# Patient Record
Sex: FEMALE | Race: OTHER | Employment: OTHER | URBAN - METROPOLITAN AREA
[De-identification: names, ages, dates, MRNs, and addresses within clinical notes are randomized per-mention and may not be internally consistent; named-entity substitution may affect disease eponyms.]

---

## 2018-07-19 ENCOUNTER — TELEPHONE (OUTPATIENT)
Dept: NEUROLOGY | Age: 58
End: 2018-07-19

## 2018-07-19 ENCOUNTER — TELEPHONE (OUTPATIENT)
Dept: SURGERY | Age: 58
End: 2018-07-19

## 2018-07-19 NOTE — TELEPHONE ENCOUNTER
KARENA to schedule with ANA PAULA Wray for colon screening consult.  Per rapid fax from Ashish Rachel MD

## 2018-08-27 ENCOUNTER — OFFICE VISIT (OUTPATIENT)
Dept: NEUROLOGY | Age: 58
End: 2018-08-27

## 2018-08-27 ENCOUNTER — HOSPITAL ENCOUNTER (OUTPATIENT)
Dept: LAB | Age: 58
Discharge: HOME | End: 2018-08-27
Payer: MEDICAID

## 2018-08-27 VITALS
HEIGHT: 67 IN | SYSTOLIC BLOOD PRESSURE: 90 MMHG | DIASTOLIC BLOOD PRESSURE: 66 MMHG | WEIGHT: 194.2 LBS | BODY MASS INDEX: 30.48 KG/M2 | HEART RATE: 61 BPM | OXYGEN SATURATION: 99 % | TEMPERATURE: 97.4 F | RESPIRATION RATE: 18 BRPM

## 2018-08-27 DIAGNOSIS — R41.89 COGNITIVE DECLINE: ICD-10-CM

## 2018-08-27 DIAGNOSIS — R41.0 CONFUSION: ICD-10-CM

## 2018-08-27 DIAGNOSIS — R41.3 MEMORY LOSS: ICD-10-CM

## 2018-08-27 DIAGNOSIS — R41.3 MEMORY LOSS: Primary | ICD-10-CM

## 2018-08-27 LAB
T4 FREE SERPL-MCNC: 1 NG/DL (ref 0.7–1.5)
TSH SERPL DL<=0.05 MIU/L-ACNC: 2.16 UIU/ML (ref 0.36–3.74)
VIT B12 SERPL-MCNC: 486 PG/ML (ref 211–911)

## 2018-08-27 PROCEDURE — 84443 ASSAY THYROID STIM HORMONE: CPT | Performed by: PSYCHIATRY & NEUROLOGY

## 2018-08-27 PROCEDURE — 84439 ASSAY OF FREE THYROXINE: CPT | Performed by: PSYCHIATRY & NEUROLOGY

## 2018-08-27 PROCEDURE — 36415 COLL VENOUS BLD VENIPUNCTURE: CPT | Performed by: PSYCHIATRY & NEUROLOGY

## 2018-08-27 PROCEDURE — 82607 VITAMIN B-12: CPT | Performed by: PSYCHIATRY & NEUROLOGY

## 2018-08-27 RX ORDER — HYDROXYZINE 25 MG/1
TABLET, FILM COATED ORAL
COMMUNITY

## 2018-08-27 RX ORDER — CYCLOBENZAPRINE HCL 5 MG
5 TABLET ORAL
COMMUNITY

## 2018-08-27 RX ORDER — HYDROCHLOROTHIAZIDE 25 MG/1
25 TABLET ORAL DAILY
COMMUNITY

## 2018-08-27 RX ORDER — ATORVASTATIN CALCIUM 10 MG/1
TABLET, FILM COATED ORAL DAILY
COMMUNITY

## 2018-08-27 RX ORDER — AMLODIPINE BESYLATE 2.5 MG/1
5 TABLET ORAL DAILY
COMMUNITY

## 2018-08-27 RX ORDER — VALACYCLOVIR HYDROCHLORIDE 1 G/1
TABLET, FILM COATED ORAL
COMMUNITY

## 2018-08-27 NOTE — PROGRESS NOTES
[address], [address], [place], [address]  [address] [place], [address]  Office:  [phone]  Fax: [phone]    Referring: Chava Deng MD      Chief Complaint   Patient presents with    Neurologic Problem     Brain Cyst since age 1. Cognitive getting worse. 50-year-old woman who presents today for evaluation of what she calls is difficulty with her memory and forgetfulness. She tells me that she has had progressive cognitive decline. She says that she is unable to care for herself or hold a job secondary to this. She says when she is driving she will get lost and not to where she is. She will have to pull over and figure it out. She says that she does not go places that she is not familiar anymore. She says in the evening she tends to get more confused and believes that she may be \"sundowning\". 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 She has a sister who is 13years older who passed secondary to dementia thought to be Alzheimer's and she  in the nursing home. Her mother also had dementia and  in a nursing home. She is worried that this is what happened to her. She forgets her medicines at times. She forgets to pay bills. She sometimes is paranoid and thinks that people are running up her bills and she says she does not really understand that because her credit is terrible. She says she is aware that these are not logical concerns. She misplaces things and at times she thinks people have moved them. She does not see things that are not there. She is not hearing voices. She does say that she has bipolar disorder and she stopped taking her medications about a year ago secondary to being too sedated. She says she thinks is a good decision. She does not find any correlation to stopping her bipolar medicines and her complaints of cognitive difficulty.   She has not seen psychiatry in over a year. She denies any current suicidal or homicidal ideation although she says that there are times when she does have such. She denies that she would ever act on such thoughts. She has a history of thyroid dysfunction and says that her daughter also has thyroid dysfunction but she has not had her thyroid checked recently. She cannot recall if it was hyperthyroid to hypothyroid but notes she was on medication some years ago. She has not had a recent B12. She also tells me that she was told that she has an inoperable brain cyst and she has not had any recent imaging. No loss or alteration in consciousness. No occult type seizure symptom. She does not awaken in the floor and on how she got there. No chest pain or palpitations. No recent fever chills. Past Medical History:   Diagnosis Date    Bipolar 1 disorder ([facility] [address]     Depression     Heart disease 2010    Hypertension     Neurological disorder     Thyroid disease        Past Surgical History:   Procedure Laterality Date    HX CHOLECYSTECTOMY         Current Outpatient Prescriptions   Medication Sig Dispense Refill    HYDROcodone-acetaminophen (NORCO) 5-325 mg per tablet Take 1 tablet by mouth every four (4) hours as needed for Pain. 10 tablet 0    lisinopril-hydrochlorothiazide (PRINZIDE, ZESTORETIC) 20-25 mg per tablet Take 1 Tab by mouth daily. 30 Tab 2    pravastatin (PRAVACHOL) 40 mg tablet Take 1 Tab by mouth nightly. 30 Tab 2    aspirin 81 mg tablet Take 1 Tab by mouth.       atenolol (TENORMIN) 25 mg tablet 1/2 tab po daily 30 Tab 2        Allergies   Allergen Reactions    Penicillins Rash       Social History   Substance Use Topics    Smoking status: Never Smoker    Smokeless tobacco: Never Used    Alcohol use 1.5 oz/week     3 Standard drinks or equivalent per week       Family History   Problem Relation Age of Onset    Cancer Mother     Cancer Father     Cancer Brother        Review of Systems:  Pertinent positives and negatives as noted otherwise comprehensive review is negative. Physical Examination:    Visit Vitals    BP 90/66 (BP 1 Location: Left arm, BP Patient Position: Sitting)    Pulse 61    Temp 97.4 °F (36.3 °C) (Oral)    Resp 18    Ht 5' 7\" (1.702 m)    Wt 88.1 kg (194 lb 3.2 oz)    SpO2 99%    BMI 30.42 kg/m2       She is pleasant. She is appropriate dress and appropriate grooming. She has an odd affect. No icterus. No edema. Heart regular. Neck is supple. No bruit. Symmetric pulses. No edema the lower extremities. Neurologically she is awake alert oriented and conversant. Her speech and language are normal.  She discusses her medical history. She discusses current events. She answers orientation questions appropriately. She is fluent. She has intact cranial nerves II-XII. No nystagmus. No abnormal movement. No abnormality of bulk or tone. No pronation. No drift. She resists fully in the upper and lower extremities in all muscle groups to direct testing. Reflexes are symmetrical in the upper and lower extremities bilaterally. No pathologic reflexes elicited. No ataxia. Sensory intact primary. Gait is steady. Impression/Plan  27-year-old lady with significant psychiatric disease off her medications but with significant family history of dementia with both her mother and her sister dying of dementia in the nursing home and with cognitive complaints, confusion as noted above and certainly differential diagnosis includes a dementing process versus metabolic versus anatomic/structural versus vascular versus secondary to underlying psychopathology versus other. At this juncture we will proceed with an MRI of the brain to evaluate for anatomic abnormality in the frontal temporal lobes that could be responsible in this in light particularly of her history of what she calls a cyst in her brain we need to know exactly what is ongoing there anatomically. We will get an EEG to evaluate baseline rhythms. Carotid Doppler to ensure adequate cerebral blood flow. In addition we will check B12 and thyroid function particularly given her history of thyroid disorder in the past and she is not on any thyroid medication. We will get a formal neuropsychological evaluation with neuropsychology to determine her level of functioning and what her true memory function is. She will return at the conclusion of her studies. Signed By: Juliana Sanz MD          This note was created using voice recognition software. Despite editing, there may be syntax errors. This note will not be viewable in [address]

## 2018-08-27 NOTE — PROGRESS NOTES
Chief Complaint   Patient presents with    Neurologic Problem     Brain Cyst since age 1. Cognitive getting worse.

## 2018-08-27 NOTE — MR AVS SNAPSHOT
[address] [facility] [address] [facility] 
[phone] Patient: April Gregg MRN: [id number] ISD:9/52/2001 Visit Information Date & Time Provider Department Dept. Phone Encounter #  
 8/27/2018 10:00 AM Reynold Mahoney MD [facility] at [address] [place] [address] [id number] Follow-up Instructions Return for NP visit after tests. Upcoming Health Maintenance Date Due Hepatitis C Screening 1960 DTaP/Tdap/Td series (1 - Tdap) 4/29/1980 PAP AKA CERVICAL CYTOLOGY 4/29/1980 BREAST CANCER SCRN MAMMOGRAM 4/29/2009 FOBT Q 1 YEAR AGE 50-75 4/29/2009 Influenza Age 5 to Adult 8/1/2018 Allergies as of 8/27/2018  Review Complete On: 8/27/2018 By: Reynold Mahoney MD  
  
 Severity Noted Reaction Type Reactions Penicillins  04/11/2013    Rash Current Immunizations  Never Reviewed No immunizations on file. Not reviewed this visit You Were Diagnosed With   
  
 Codes Comments Memory loss    -  Primary ICD-10-CM: R41.3 ICD-9-CM: 780.93 Confusion     ICD-10-CM: R41.0 ICD-9-CM: 298.9 Cognitive decline     ICD-10-CM: R41.89 ICD-9-CM: 294.9 Vitals BP Pulse Temp Resp Height(growth percentile) Weight(growth percentile) 90/66 (BP 1 Location: Left arm, BP Patient Position: Sitting) 61 97.4 °F (36.3 °C) (Oral) 18 5' 7\" (1.702 m) 194 lb 3.2 oz (88.1 kg) SpO2 BMI OB Status Smoking Status 99% 30.42 kg/m2 Postmenopausal Never Smoker BMI and BSA Data Body Mass Index Body Surface Area  
 30.42 kg/m 2 2.04 m 2 Preferred Pharmacy Pharmacy Name Phone [facility] [phone] Your Updated Medication List  
  
   
This list is accurate as of 8/27/18 10:20 AM.  Always use your most recent med list. amLODIPine 2.5 mg tablet Commonly known as:  Gabino Daugherty Take  by mouth daily. aspirin 81 mg tablet Take 1 Tab by mouth. atenolol 25 mg tablet Commonly known as:  TENORMIN  
1/2 tab po daily  
  
 atorvastatin 10 mg tablet Commonly known as:  LIPITOR Take  by mouth daily. cyclobenzaprine 5 mg tablet Commonly known as:  FLEXERIL Take 5 mg by mouth. GLUCOSAMINE 1500 COMPLEX PO Take  by mouth. hydroCHLOROthiazide 25 mg tablet Commonly known as:  HYDRODIURIL Take 25 mg by mouth daily. HYDROcodone-acetaminophen 5-325 mg per tablet Commonly known as:  March Castles Take 1 tablet by mouth every four (4) hours as needed for Pain.  
  
 hydrOXYzine HCl 25 mg tablet Commonly known as:  ATARAX Take  by mouth three (3) times daily as needed for Itching. lisinopril-hydroCHLOROthiazide 20-25 mg per tablet Commonly known as:  [place]ana Kemps Take 1 Tab by mouth daily. pravastatin 40 mg tablet Commonly known as:  PRAVACHOL Take 1 Tab by mouth nightly. valACYclovir 1 gram tablet Commonly known as:  VALTREX Take  by mouth. We Performed the Following REFERRAL TO NEUROPSYCHOLOGY [RDR58 Custom] Comments:  
 Memory difficulty and fhx of dementia Follow-up Instructions Return for NP visit after tests. To-Do List   
 08/27/2018 Vascular/US:  DUPLEX CAROTID BILATERAL   
  
 08/27/2018 Neurology:  EEG   
  
 08/27/2018 Imaging:  MRI BRAIN W WO CONT   
  
 08/27/2018 Lab:  T4, FREE   
  
 08/27/2018 Lab:  TSH 3RD GENERATION   
  
 08/27/2018 Lab:  VITAMIN B12 Referral Information Referral ID Referred By Referred To  
  
 [id number] ADAM SLADE Not Available Visits Status Start Date End Date 1 New Request 8/27/18 8/27/19 If your referral has a status of pending review or denied, additional information will be sent to support the outcome of this decision. Patient Instructions Thank you for choosing [facility] for your  
 
care. You may receive a survey about your visit. We appreciate you taking time  
 
to complete this survey as we use your feedback to improve our services. We  
 
realize we are not perfect, but we strive to provide excellent care. Introducing [facility]! Dear Antonio Tobin: Thank you for requesting a [employer] account. Our records indicate that you already have an active [employer] account. You can access your account anytime at [email or web address]. [email or web address] Did you know that you can access your hospital and ER discharge instructions at any time in [employer]? You can also review all of your test results from your hospital stay or ER visit. Additional Information If you have questions, please visit the Frequently Asked Questions section of the [employer] website at [email or web address] Remember, [employer] is NOT to be used for urgent needs. For medical emergencies, dial 911. Now available from your iPhone and Android! Please provide this summary of care documentation to your next provider. Your primary care clinician is listed as Ivania Terrazas. If you have any questions after today's visit, please call [phone].

## 2018-08-27 NOTE — PATIENT INSTRUCTIONS
Thank you for choosing [facility] and [facility] for your     care. You may receive a survey about your visit. We appreciate you taking time     to complete this survey as we use your feedback to improve our services. We     realize we are not perfect, but we strive to provide excellent care.

## 2018-08-31 ENCOUNTER — TELEPHONE (OUTPATIENT)
Dept: NEUROLOGY | Age: 58
End: 2018-08-31

## 2018-08-31 NOTE — TELEPHONE ENCOUNTER
Notification of inability to kip apt   Dr. Carlos Rob still in credentialing.   Placed in NP Nick's folder @ [address] [place] [address]

## 2018-09-05 NOTE — TELEPHONE ENCOUNTER
Referral transferred to Dr. Sharmin Lima, with [facility].  Spoke with patient verified name and , patient informed of new referral.

## 2018-09-21 ENCOUNTER — HOSPITAL ENCOUNTER (OUTPATIENT)
Dept: NEUROLOGY | Age: 58
Discharge: HOME | End: 2018-09-21
Payer: MEDICAID

## 2018-09-21 ENCOUNTER — HOSPITAL ENCOUNTER (OUTPATIENT)
Dept: VASCULAR SURGERY | Age: 58
Discharge: HOME | End: 2018-09-21
Payer: MEDICAID

## 2018-09-21 ENCOUNTER — HOSPITAL ENCOUNTER (OUTPATIENT)
Dept: MRI IMAGING | Age: 58
Discharge: HOME | End: 2018-09-21
Payer: MEDICAID

## 2018-09-21 VITALS — BODY MASS INDEX: 27.41 KG/M2 | WEIGHT: 175 LBS

## 2018-09-21 DIAGNOSIS — R41.89 COGNITIVE DECLINE: ICD-10-CM

## 2018-09-21 DIAGNOSIS — R41.0 CONFUSION: ICD-10-CM

## 2018-09-21 DIAGNOSIS — R41.3 MEMORY LOSS: ICD-10-CM

## 2018-09-21 LAB — CREAT UR-MCNC: 1 MG/DL (ref 0.6–1.3)

## 2018-09-21 PROCEDURE — 82565 ASSAY OF CREATININE: CPT

## 2018-09-21 PROCEDURE — 93880 EXTRACRANIAL BILAT STUDY: CPT

## 2018-09-21 PROCEDURE — 74011250636 HC RX REV CODE- 250/636

## 2018-09-21 PROCEDURE — 70553 MRI BRAIN STEM W/O & W/DYE: CPT

## 2018-09-21 PROCEDURE — A9575 INJ GADOTERATE MEGLUMI 0.1ML: HCPCS

## 2018-09-21 PROCEDURE — 95819 EEG AWAKE AND ASLEEP: CPT

## 2018-09-21 PROCEDURE — 95816 EEG AWAKE AND DROWSY: CPT

## 2018-09-21 RX ORDER — GADOTERATE MEGLUMINE 376.9 MG/ML
15 INJECTION INTRAVENOUS
Status: COMPLETED | OUTPATIENT
Start: 2018-09-21 | End: 2018-09-21

## 2018-09-21 RX ADMIN — GADOTERATE MEGLUMINE 15 ML: 376.9 INJECTION INTRAVENOUS at 12:16

## 2018-09-24 LAB
LEFT CCA DIST DIAS: 21.26 CM/S
LEFT CCA DIST SYS: 63.79 CM/S
LEFT CCA MID DIAS: 22.15 CM/S
LEFT CCA MID SYS: 78.85 CM/S
LEFT CCA PROX DIAS: 18.78 CM/S
LEFT CCA PROX SYS: 74.18 CM/S
LEFT ECA DIAS: 16.07 CM/S
LEFT ECA SYS: 73.77 CM/S
LEFT ICA DIST DIAS: 26.58 CM/S
LEFT ICA DIST SYS: 70.07 CM/S
LEFT ICA MID DIAS: 28.45 CM/S
LEFT ICA MID SYS: 64.84 CM/S
LEFT ICA PROX DIAS: 16.53 CM/S
LEFT ICA PROX SYS: 63.11 CM/S
LEFT ICA/CCA SYS: 0.89
LEFT SUBCLAVIAN DIAS: 0 CM/S
LEFT SUBCLAVIAN SYS: 85.09 CM/S
LEFT VERTEBRAL DIAS: 10.59 CM/S
LEFT VERTEBRAL SYS: 32.5 CM/S
RIGHT CCA DIST DIAS: 19.54 CM/S
RIGHT CCA DIST SYS: 63.11 CM/S
RIGHT CCA MID DIAS: 20.7 CM/S
RIGHT CCA MID SYS: 67.69 CM/S
RIGHT CCA PROX DIAS: 20.7 CM/S
RIGHT CCA PROX SYS: 94.76 CM/S
RIGHT ECA DIAS: 12.02 CM/S
RIGHT ECA SYS: 77.19 CM/S
RIGHT ICA DIST DIAS: 23.92 CM/S
RIGHT ICA DIST SYS: 54.04 CM/S
RIGHT ICA MID DIAS: 27.51 CM/S
RIGHT ICA MID SYS: 69.93 CM/S
RIGHT ICA PROX DIAS: 22.14 CM/S
RIGHT ICA PROX SYS: 62.63 CM/S
RIGHT ICA/CCA SYS: 0.74
RIGHT SUBCLAVIAN DIAS: 0 CM/S
RIGHT SUBCLAVIAN SYS: 98.59 CM/S
RIGHT VERTEBRAL DIAS: 19.31 CM/S
RIGHT VERTEBRAL SYS: 58.55 CM/S

## 2018-09-26 NOTE — PROCEDURES
[address]  MR#: [id number]  : 1960  ACCOUNT #: [de-identified]   DATE OF SERVICE: 2018    EEG NUMBER:  [facility]. This is an apparently wakeful and drowsy EEG on this 60year-old patient with who appears to have some memory problems for the past several months. She apparently has had some transient neurologic symptoms and a memory disorder. MEDICATIONS:  Norvasc, albuterol, Atarax, Flexeril, aspirin, Lipitor. EEG REPORT:  The predominant apparently wakeful background consists of 30-40 microvolt, irregular but symmetrical 7-8 Hz waves, which attenuate poorly with eye opening. Bifrontal 3-5 microvolt, 16-20 Hz waves are seen, which are also symmetrical in their occurrence. Step flash photic stimulation was performed that was driving between 3 and 12 flashes per second. During what appears to be drowsiness, the posterior rhythm consists of 20-30 microvolts, 6-7, and 15-20 microvolts, 4-5 Hz waves. Symmetric vertex sharp waves are identified. Noted intermittently throughout the record, emanating from about the T3 and T4 region, the occurrence of 20-30 microvolt, 4-5 Hz waves. IMPRESSION:  This was an abnormal wakeful and drowsy EEG. Due to the presence of some generalized slow wave activity indicative of a diffuse encephalopathic process. No epileptiform activity was identified.       MD EARL Ackerman / HANNAH  D: 2018 17:46     T: 2018 00:42  JOB #: [id number]  CC: Mitali Mueller MD

## 2019-05-09 ENCOUNTER — OFFICE VISIT (OUTPATIENT)
Dept: ORTHOPEDIC SURGERY | Age: 59
End: 2019-05-09

## 2019-05-09 VITALS
WEIGHT: 194.4 LBS | DIASTOLIC BLOOD PRESSURE: 81 MMHG | HEART RATE: 70 BPM | BODY MASS INDEX: 30.51 KG/M2 | HEIGHT: 67 IN | OXYGEN SATURATION: 94 % | TEMPERATURE: 98.3 F | SYSTOLIC BLOOD PRESSURE: 133 MMHG

## 2019-05-09 DIAGNOSIS — M76.72 PERONEUS LONGUS TENDINITIS, LEFT: ICD-10-CM

## 2019-05-09 DIAGNOSIS — M76.812 ANTERIOR TIBIAL TENDONITIS, LEFT: Primary | ICD-10-CM

## 2019-05-09 DIAGNOSIS — M77.8 TOE TENDINITIS: ICD-10-CM

## 2019-05-09 RX ORDER — BUPROPION HYDROCHLORIDE 150 MG/1
150 TABLET ORAL
COMMUNITY

## 2019-05-09 RX ORDER — CELECOXIB 200 MG/1
200 CAPSULE ORAL
Qty: 60 CAP | Refills: 2 | Status: SHIPPED | OUTPATIENT
Start: 2019-05-09 | End: 2019-06-20

## 2019-05-09 RX ORDER — PREDNISONE 20 MG/1
TABLET ORAL
Qty: 28 TAB | Refills: 0 | Status: SHIPPED | OUTPATIENT
Start: 2019-05-09 | End: 2019-06-20 | Stop reason: ALTCHOICE

## 2019-05-09 RX ORDER — BUSPIRONE HYDROCHLORIDE 10 MG/1
10 TABLET ORAL
COMMUNITY

## 2019-05-09 NOTE — PROGRESS NOTES
HISTORY OF PRESENT ILLNESS    Cailin Broderick is a 60y.o. year old female comes in today as new patient for: left ankle/big toe pain    Patients symptoms have been present for 1 years but worse last couple months. Pain level 8/10 anterior and medial into toe, It has worsened with walking. Patient has tried: Many NSAIDs (naproxen, mobic, ibuprofen) w/ many side effects. It is described as pain as described as likely after compensating due to cyst in brain w/ changes in gait and numbness left side. IMAGING: XR left ankle 5/6/19  IMPRESSION:  No significant abnormality. Past Surgical History:   Procedure Laterality Date    HX CHOLECYSTECTOMY       Social History     Socioeconomic History    Marital status:      Spouse name: Not on file    Number of children: Not on file    Years of education: Not on file    Highest education level: Not on file   Tobacco Use    Smoking status: Never Smoker    Smokeless tobacco: Never Used   Substance and Sexual Activity    Alcohol use: Yes     Alcohol/week: 1.5 oz     Types: 3 Standard drinks or equivalent per week    Drug use: Yes     Frequency: 2.0 times per week     Types: Cocaine     Comment: 11/16/14      Current Outpatient Medications   Medication Sig Dispense Refill    buPROPion XL (WELLBUTRIN XL) 150 mg tablet Take 150 mg by mouth.  busPIRone (BUSPAR) 10 mg tablet Take 10 mg by mouth.  hydroCHLOROthiazide (HYDRODIURIL) 25 mg tablet Take 25 mg by mouth daily.  cyclobenzaprine (FLEXERIL) 5 mg tablet Take 5 mg by mouth.  atorvastatin (LIPITOR) 10 mg tablet Take  by mouth daily.  valACYclovir (VALTREX) 1 gram tablet Take  by mouth.  lisinopril-hydrochlorothiazide (PRINZIDE, ZESTORETIC) 20-25 mg per tablet Take 1 Tab by mouth daily. 30 Tab 2    pravastatin (PRAVACHOL) 40 mg tablet Take 1 Tab by mouth nightly. 30 Tab 2    aspirin 81 mg tablet Take 1 Tab by mouth.       atenolol (TENORMIN) 25 mg tablet 1/2 tab po daily 30 Tab 2    amLODIPine (NORVASC) 2.5 mg tablet Take  by mouth daily.  hydrOXYzine HCl (ATARAX) 25 mg tablet Take  by mouth three (3) times daily as needed for Itching.  gluc gillis/chondro gillis A/vit C/Mn (GLUCOSAMINE 1500 COMPLEX PO) Take  by mouth.  HYDROcodone-acetaminophen (NORCO) 5-325 mg per tablet Take 1 tablet by mouth every four (4) hours as needed for Pain. 10 tablet 0     Past Medical History:   Diagnosis Date    Bipolar 1 disorder ([facility] [address]     Depression     Heart disease 2010    Hypertension     Neurological disorder     Thyroid disease      Family History   Problem Relation Age of Onset    Cancer Mother     Cancer Father     Cancer Brother          ROS:  No swell. All other systems reviewed and negative aside from that written in the HPI. Objective:  /81   Pulse 70   Temp 98.3 °F (36.8 °C) (Oral)   Ht 5' 7\" (1.702 m)   Wt 194 lb 6.4 oz (88.2 kg)   SpO2 94%   BMI 30.45 kg/m²   GEN: Appears stated age in NAD. HEAD:  Normocephalic, atraumatic. NEURO:  Sensation intact light touch B/L lower extremities. MS:  Strength normal throughout upper and lower extremities bilateral .   left ankle/foot:  Positive tenderness at tib anterior, peroneus, Ext brar longus. Negative for tenderness at malleoli. Anterior drawer test negative. Talar tilt negative. Kleiger test negative. Syndesmosis squeeze negative. negative fibular head tenderness. Fibular head motion normal. Gait normal.  no clubbing/cyanosis. ROM normal.  EXT: no clubbing/cyanosis. no edema. SKIN: Warm/dry without rash. HEENT: Conjunctiva/lids WNL. External canals/nares WNL. Tongue midline. PERRL, EOMI. Hearing intact. NECK: Trachea midline. Supple, Full ROM. No thyromegaly. CARDIAC: No edema. LUNGS: Normal effort. ABD: Soft, no masses. No HSM. PSYCH: A+O x3. Appropriate judgment and insight. Assessment/Plan:     ICD-10-CM ICD-9-CM    1.  Anterior tibial tendonitis, left M76.812 726.72 predniSONE (DELTASONE) 20 mg tablet      celecoxib (CELEBREX) 200 mg capsule   2. Peroneus longus tendinitis, left M76.72 727.06 predniSONE (DELTASONE) 20 mg tablet      celecoxib (CELEBREX) 200 mg capsule   3. Toe tendinitis M77.9 727.06 predniSONE (DELTASONE) 20 mg tablet      celecoxib (CELEBREX) 200 mg capsule     Patient (or guardian if minor) verbalizes understanding of evaluation and plan. Will demo stretch for above and use prednisone taper w/ celebrex PRN and RTC 4-6 weeks.

## 2019-05-09 NOTE — PROGRESS NOTES
AVS reviewed: YES  HEP: Pt effectively performed  Resources Provided: YES Both Videos & Photos  Patient questions/concerns answered: NO. Pt denied questions/concerns  Patient verbalized understanding of treatment plan: YES

## 2019-05-13 ENCOUNTER — TELEPHONE (OUTPATIENT)
Dept: ORTHOPEDIC SURGERY | Age: 59
End: 2019-05-13

## 2019-05-13 DIAGNOSIS — M76.72 PERONEUS LONGUS TENDINITIS, LEFT: ICD-10-CM

## 2019-05-13 DIAGNOSIS — M77.8 TOE TENDINITIS: ICD-10-CM

## 2019-05-13 DIAGNOSIS — M76.812 ANTERIOR TIBIAL TENDONITIS, LEFT: Primary | ICD-10-CM

## 2019-05-13 RX ORDER — ETODOLAC 400 MG/1
400 TABLET, FILM COATED ORAL 2 TIMES DAILY WITH MEALS
Qty: 180 TAB | Refills: 0 | Status: SHIPPED | OUTPATIENT
Start: 2019-05-13

## 2019-05-13 RX ORDER — ETODOLAC 500 MG/1
500 TABLET, FILM COATED ORAL 2 TIMES DAILY
Qty: 180 TAB | Refills: 0 | Status: SHIPPED | OUTPATIENT
Start: 2019-05-13 | End: 2019-05-13 | Stop reason: RX

## 2019-05-13 NOTE — TELEPHONE ENCOUNTER
Patient called regarding this, she states the pharmacy told her she needed to try the prescription Etodolac first before trying the Celebrex. Please advise patient back regarding this at [phone].

## 2019-05-13 NOTE — TELEPHONE ENCOUNTER
Prior Auth received for Celebrex. Do you want PA completed or would you like to order different medication?

## 2019-05-13 NOTE — TELEPHONE ENCOUNTER
Called and informed Pt that the Rx for Lodine has been sent to the pharmacy. Pt confirmed correct pharmacy, [facility] on [address] [facility] and thanked caller.

## 2019-06-20 ENCOUNTER — OFFICE VISIT (OUTPATIENT)
Dept: ORTHOPEDIC SURGERY | Age: 59
End: 2019-06-20

## 2019-06-20 VITALS
TEMPERATURE: 97.6 F | RESPIRATION RATE: 15 BRPM | DIASTOLIC BLOOD PRESSURE: 80 MMHG | SYSTOLIC BLOOD PRESSURE: 140 MMHG | BODY MASS INDEX: 30.39 KG/M2 | WEIGHT: 193.6 LBS | HEIGHT: 67 IN | HEART RATE: 66 BPM

## 2019-06-20 DIAGNOSIS — M77.8 TOE TENDINITIS: Primary | ICD-10-CM

## 2019-06-20 DIAGNOSIS — M76.812 ANTERIOR TIBIAL TENDONITIS, LEFT: ICD-10-CM

## 2019-06-20 DIAGNOSIS — M76.72 PERONEUS LONGUS TENDINITIS, LEFT: ICD-10-CM

## 2019-06-20 RX ORDER — DICLOFENAC SODIUM 10 MG/G
2 GEL TOPICAL
Qty: 200 G | Refills: 1 | Status: SHIPPED | OUTPATIENT
Start: 2019-06-20

## 2019-06-20 RX ORDER — CITALOPRAM 20 MG/1
TABLET, FILM COATED ORAL DAILY
COMMUNITY

## 2019-06-20 RX ORDER — DIPHENHYDRAMINE HCL 25 MG
25 CAPSULE ORAL
COMMUNITY

## 2019-06-20 RX ORDER — TRAZODONE HYDROCHLORIDE 50 MG/1
TABLET ORAL
COMMUNITY

## 2019-06-20 NOTE — PROGRESS NOTES
AVS reviewed: YES  HEP: Pt declined demo.  Pt states already familiar; has seen videos  Resources Provided: [place]  Patient questions/concerns answered: NO. Pt denied questions/concerns  Patient verbalized understanding of treatment plan: YES

## 2019-06-20 NOTE — PROGRESS NOTES
HISTORY OF PRESENT ILLNESS    Celia Merchant is a 60y.o. year old female comes in today to be evaluated and treated for: left leg pain    Since last appt has noticed improvement with HEP and prednisone taper w/ lodine (insurance would not cover celebrex). Pain level 4/10. Pain is now only on great toe knuckle. IMAGING: XR left ankle 5/6/19  IMPRESSION:  No significant abnormality. Past Surgical History:   Procedure Laterality Date    HX APPENDECTOMY      HX CHOLECYSTECTOMY       Social History     Socioeconomic History    Marital status:      Spouse name: Not on file    Number of children: Not on file    Years of education: Not on file    Highest education level: Not on file   Tobacco Use    Smoking status: Never Smoker    Smokeless tobacco: Never Used   Substance and Sexual Activity    Alcohol use: Yes     Alcohol/week: 1.5 oz     Types: 3 Standard drinks or equivalent per week    Drug use: Yes     Frequency: 2.0 times per week     Types: Cocaine     Comment: 11/16/14     Current Outpatient Medications   Medication Sig Dispense Refill    traZODone (DESYREL) 50 mg tablet Take  by mouth nightly.  citalopram (CELEXA) 20 mg tablet Take  by mouth daily.  diphenhydrAMINE (BENADRYL) 25 mg capsule Take 25 mg by mouth every six (6) hours as needed.  buPROPion XL (WELLBUTRIN XL) 150 mg tablet Take 150 mg by mouth.  amLODIPine (NORVASC) 2.5 mg tablet Take 5 mg by mouth daily.  hydroCHLOROthiazide (HYDRODIURIL) 25 mg tablet Take 25 mg by mouth daily.  cyclobenzaprine (FLEXERIL) 5 mg tablet Take 5 mg by mouth.  atorvastatin (LIPITOR) 10 mg tablet Take  by mouth daily.  valACYclovir (VALTREX) 1 gram tablet Take  by mouth.  aspirin 81 mg tablet Take 1 Tab by mouth.  etodolac (LODINE) 400 mg tablet Take 400 mg by mouth two (2) times daily (with meals). 180 Tab 0    busPIRone (BUSPAR) 10 mg tablet Take 10 mg by mouth.       celecoxib (CELEBREX) 200 mg capsule Take 1 Cap by mouth two (2) times daily as needed for Pain for up to 90 days. 60 Cap 2    hydrOXYzine HCl (ATARAX) 25 mg tablet Take  by mouth three (3) times daily as needed for Itching.  gluc gillis/chondro gillis A/vit C/Mn (GLUCOSAMINE 1500 COMPLEX PO) Take  by mouth.  HYDROcodone-acetaminophen (NORCO) 5-325 mg per tablet Take 1 tablet by mouth every four (4) hours as needed for Pain. 10 tablet 0    lisinopril-hydrochlorothiazide (PRINZIDE, ZESTORETIC) 20-25 mg per tablet Take 1 Tab by mouth daily. 30 Tab 2    pravastatin (PRAVACHOL) 40 mg tablet Take 1 Tab by mouth nightly. 30 Tab 2    atenolol (TENORMIN) 25 mg tablet 1/2 tab po daily 30 Tab 2     Past Medical History:   Diagnosis Date    B[facility] 1 disorder ([facility] [address]     Depression     Heart disease 2010    Hypertension     Neurological disorder     Thyroid disease      Family History   Problem Relation Age of Onset    Cancer Mother     Cancer Father     Cancer Brother          ROS:  No swell, numb    Objective:  /80   Pulse 66   Temp 97.6 °F (36.4 °C)   Resp 15   Ht 5' 7\" (1.702 m)   Wt 193 lb 9.6 oz (87.8 kg)   BMI 30.32 kg/m²   GEN: Appears stated age in NAD. HEAD:  Normocephalic, atraumatic. NEURO:  Sensation intact light touch B/L lower extremities. MS:  Strength normal throughout upper and lower extremities bilateral .   left ankle/foot:  Positive tenderness at tib anterior, peroneus, Ext brar longus. Negative for tenderness at malleoli. Anterior drawer test negative. Talar tilt negative. Kleiger test negative. Syndesmosis squeeze negative. negative fibular head tenderness. Fibular head motion normal. Gait normal.  no clubbing/cyanosis. ROM normal.  EXT: no clubbing/cyanosis. no edema. SKIN: Warm/dry without rash. Assessment/Plan:     ICD-10-CM ICD-9-CM    1. Toe tendinitis M77.9 727.06 diclofenac (VOLTAREN) 1 % gel   2. Anterior tibial tendonitis, left M76.812 726.72 diclofenac (VOLTAREN) 1 % gel   3.  Peroneus longus tendinitis, left M76.72 727.06 diclofenac (VOLTAREN) 1 % gel     Patient (or guardian if minor) verbalizes understanding of evaluation and plan. Will Rx voltaren gel for PRN and stretch as demo and RTC as needed.

## 2021-01-13 ENCOUNTER — TRANSCRIBE ORDER (OUTPATIENT)
Dept: SCHEDULING | Age: 61
End: 2021-01-13

## 2021-01-13 DIAGNOSIS — Z12.31 SCREENING MAMMOGRAM FOR HIGH-RISK PATIENT: Primary | ICD-10-CM

## 2021-01-15 ENCOUNTER — HOSPITAL ENCOUNTER (OUTPATIENT)
Dept: MAMMOGRAPHY | Age: 61
Discharge: HOME | End: 2021-01-15
Payer: MEDICAID

## 2021-01-15 DIAGNOSIS — Z12.31 VISIT FOR SCREENING MAMMOGRAM: ICD-10-CM

## 2021-01-15 DIAGNOSIS — Z12.31 SCREENING MAMMOGRAM FOR HIGH-RISK PATIENT: ICD-10-CM

## 2021-01-15 PROCEDURE — 77067 SCR MAMMO BI INCL CAD: CPT

## 2022-02-21 ENCOUNTER — TRANSCRIBE ORDER (OUTPATIENT)
Dept: SCHEDULING | Age: 62
End: 2022-02-21

## 2022-02-21 DIAGNOSIS — Z12.31 SCREENING MAMMOGRAM, ENCOUNTER FOR: Primary | ICD-10-CM

## 2023-01-30 DIAGNOSIS — Z12.31 SCREENING MAMMOGRAM, ENCOUNTER FOR: Primary | ICD-10-CM

## 2023-02-03 DIAGNOSIS — Z12.31 SCREENING MAMMOGRAM, ENCOUNTER FOR: Primary | ICD-10-CM
